# Patient Record
Sex: MALE | Race: WHITE
[De-identification: names, ages, dates, MRNs, and addresses within clinical notes are randomized per-mention and may not be internally consistent; named-entity substitution may affect disease eponyms.]

---

## 2018-12-13 ENCOUNTER — HOSPITAL ENCOUNTER (EMERGENCY)
Dept: HOSPITAL 65 - ER | Age: 33
Discharge: HOME | End: 2018-12-13
Payer: COMMERCIAL

## 2018-12-13 VITALS — HEIGHT: 71 IN | WEIGHT: 175 LBS | BODY MASS INDEX: 24.5 KG/M2

## 2018-12-13 VITALS — SYSTOLIC BLOOD PRESSURE: 144 MMHG | DIASTOLIC BLOOD PRESSURE: 79 MMHG

## 2018-12-13 VITALS — SYSTOLIC BLOOD PRESSURE: 159 MMHG | DIASTOLIC BLOOD PRESSURE: 94 MMHG

## 2018-12-13 DIAGNOSIS — R10.11: ICD-10-CM

## 2018-12-13 DIAGNOSIS — R05: ICD-10-CM

## 2018-12-13 DIAGNOSIS — G40.909: Primary | ICD-10-CM

## 2018-12-13 DIAGNOSIS — R07.9: ICD-10-CM

## 2018-12-13 LAB
ALP INTEST CFR SERPL: 94 U/L (ref 50–136)
ALT SERPL-CCNC: 49 U/L (ref 12–78)
APPEARANCE UR: CLEAR
AST SERPL-CCNC: 59 U/L (ref 0–35)
BASOPHILS # BLD AUTO: 0 10^3/UL (ref 0–0.1)
BASOPHILS NFR BLD AUTO: 0.1 % (ref 0–0.2)
BILIRUB UR STRIP.AUTO-MCNC: NEGATIVE MG/DL
CALCIUM SERPL-MCNC: 10.1 MG/DL (ref 8.4–10.5)
CO2 BLDA-SCNC: 27.6 MMOL/L (ref 20–32)
COLOR UR: YELLOW
EOSINOPHIL # BLD AUTO: 0.1 10^3/UL (ref 0–0.2)
EOSINOPHIL NFR BLD AUTO: 1.2 % (ref 0–5)
ERYTHROCYTE [DISTWIDTH] IN BLOOD BY AUTOMATED COUNT: 12.7 % (ref 11.5–14.5)
GLUCOSE PRE 100 G GLC PO SERPL-MCNC: 150 MG/DL (ref 70–110)
HGB BLD-MCNC: 14.5 G/DL (ref 13.9–16.3)
LYMPHOCYTES # BLD AUTO: 2 10^3/UL (ref 1–4.8)
LYMPHOCYTES NFR BLD AUTO: 18.6 % (ref 24–44)
MCH RBC QN AUTO: 30.3 PG (ref 26–34)
MCHC RBC AUTO-ENTMCNC: 34 G/DL (ref 33–37)
MCV RBC AUTO: 89.1 FL (ref 78–100)
MONOCYTES # BLD AUTO: 1 10^3/UL (ref 0.3–0.8)
MONOCYTES NFR BLD AUTO: 8.9 % (ref 5–12)
NEUTROPHILS # BLD AUTO: 7.6 10^3/UL (ref 1.8–7.7)
NEUTROPHILS NFR BLD AUTO: 71 % (ref 41–85)
PLATELET # BLD AUTO: 237 10^3/UL (ref 150–400)
PMV BLD AUTO: 10.8 FL (ref 7.8–11)
UROBILINOGEN UR QL STRIP.AUTO: NORMAL
WBC # BLD AUTO: 10.7 10^3/UL (ref 4.5–11)

## 2018-12-13 PROCEDURE — 36415 COLL VENOUS BLD VENIPUNCTURE: CPT

## 2018-12-13 PROCEDURE — 85730 THROMBOPLASTIN TIME PARTIAL: CPT

## 2018-12-13 PROCEDURE — 81002 URINALYSIS NONAUTO W/O SCOPE: CPT

## 2018-12-13 PROCEDURE — 80164 ASSAY DIPROPYLACETIC ACD TOT: CPT

## 2018-12-13 PROCEDURE — 85610 PROTHROMBIN TIME: CPT

## 2018-12-13 PROCEDURE — 99284 EMERGENCY DEPT VISIT MOD MDM: CPT

## 2018-12-13 PROCEDURE — 85025 COMPLETE CBC W/AUTO DIFF WBC: CPT

## 2018-12-13 PROCEDURE — 70450 CT HEAD/BRAIN W/O DYE: CPT

## 2018-12-13 PROCEDURE — 93005 ELECTROCARDIOGRAM TRACING: CPT

## 2018-12-13 PROCEDURE — 80307 DRUG TEST PRSMV CHEM ANLYZR: CPT

## 2018-12-13 PROCEDURE — 82550 ASSAY OF CK (CPK): CPT

## 2018-12-13 PROCEDURE — 80053 COMPREHEN METABOLIC PANEL: CPT

## 2018-12-13 PROCEDURE — 84484 ASSAY OF TROPONIN QUANT: CPT

## 2018-12-13 PROCEDURE — 71045 X-RAY EXAM CHEST 1 VIEW: CPT

## 2018-12-13 NOTE — DIREP
PROCEDURE:CT HEAD WITHOUT CONTRAST

 

TECHNIQUE:Axial cuts were obtained through the head, without intravenous 

contrast material.  The images were viewed at brain and bone settings.   

 

COMPARISON:None.

 

INDICATIONS:Seizures, AMS

 

FINDINGS:

VENTRICLES:Normal.

CEREBRUM:Diminished sulcal size may be secondary to recent seizure.  No 

hemorrhage, infarct or mass lesion

CEREBELLUM:Normal.

BRAINSTEM:Normal.

SKULL:Normal.

SINUSES:Normal.

OTHER:Negative.

 

CONCLUSION:Diminished sulcal size may be secondary to recent seizure or 

represent normal finding.  No other abnormalities.

 

 

 

Dictated by: Juan Richardson M.D. on 12/13/2018 at 08:36 PM     

Electronically Signed By: Juan Richardson M.D. on 12/13/2018 at 08:39 PM

## 2018-12-13 NOTE — NUR
PLAN OF CARE



PER DR SUÁREZ, AFTER MEDICALLY CLEARED, [facility] WILL BE CONTACTED FOR POSSIBLE 
FURTHER EVALUATION FOR TREATMENT.

## 2018-12-13 NOTE — ER.PDOC
General


Chief Complaint:  Requesting Medical Care


Stated Complaint:  POSSIBLE SEIZURES


Time seen by MD:  19:42


Source:  patient


Exam Limitations:  no limitations





History of Present Illness


Initial Comments


Pt states he is epileptic and that over the last several days he has been 

living in his car, lives in [place] and has not taken his medicines for that 

long. Not seizing at the moment he also states that he has chest pain and cough 

for a few days


Timing/Onset/Duration:  Unknown Duration, Unknown Number


Preceding Symptoms/Context:  none


Injury:  none


Allergies:  


Coded Allergies:  


     No Known Allergies (Unverified , 4/18/15)


Home Meds


No Active Prescriptions or Reported Meds





Past Medical History


Surgical History:  no surgical history





Constitutional:  no symptoms reported


EENTM:  no symptoms reported


Respiratory:  cough


Cardiovascular:  chest pain


Gastrointestinal:  no symptoms reported


Genitourinary:  no symptoms reported


Musculoskeletal:  no symptoms reported


Skin:  no symptoms reported


Psychiatric/Neurological:  see HPI, anxiety


Endocrine:  no symptoms reported


Hematologic/Lymphatic:  no symptoms reported





Physical Exam


General Appearance:  alert, no distress


EENT:  nml eye inspection, PERRL, no nystagmus, nml ENT inspection, no apparent

, pharynx nml, no CSF leak


Neck/Back:  neck supple, non-tender


Respiratory:  no resp distress, breath sounds nml, no evidence of rib injury, 

rhonchi (scattered)


CVS:  reg rate & rhythm, heart sounds nml


Abdomen:  no organomegaly, no distention, tenderness (RUQ minimal)


Skin:  color nml, no rash, warm/dry


Extremities:  non-tender, nml ROM, no pedal edema


Neuro/Psych:  oriented x 3, speech nml, mood/affect nml


Cerebellar:  nml tested, nml Romberg


Sensorimotor:  no motor deficit, no sensory deficit, reflexes nml





Departure


Time of Disposition:  21:14


Disposition:  01 HOME, SELF-CARE


Impression:  


 Primary Impression:  


 Seizure disorder


Condition:  Stable


Patient Instructions:  Seizure, Adult


Referrals:  


PCP,UNKNOWN (PCP)


PRIMARY CARE PROVIDER


Scripts


No Active Prescriptions or Reported Meds


Duration or Time Spent with Pa:  20 BASILICO,LEOPOLDO M MD Dec 13, 2018 19:50

## 2018-12-13 NOTE — NUR
DISCHARGE PLAN



PATIENT STATES THAT HE HAS HIS CAR AND CAN FIND HIS WAY BACK HOME TO [place], 
BUT HE DOES NOT HAVE ANY GAS IN HIS CAR OR MONEY TO GET ANY.

## 2018-12-13 NOTE — NUR
CONFUSION



PATIENT VOICES THAT HIS WIFE AND FATHER IN LAW LEFT HIM IN HIS VEHICLE IN 
"[facility]" [place] AND HE HAS BEEN IN HIS CAR FOR THREE DAYS WITH ON AND OFF 
SEIZURES. HE CAN REMEMBER THAT HE IS IN [place], [place] BECAUSE I TOLD HIM THAT IS 
WHERE HE IS, BUT HE DOES NOT KNOW WHERE [place], [place] IS. STATES HE LIVES IN 
[place], "I'M CLOSE TO [place], RIGHT?"

ONCE AGAIN ASKED PATIENT WHY HE WAS LIVING IN HIS CAR THE PAST THREE DAYS IF HE 
HAS A HOUSE IN [place], PATIENT IS UNABLE TO TELL ME WHY HE HAS BEEN IN HIS CAR. 


CONTINUES TO STATE THAT HIS WIFE DOESN'T KNOW WHERE HE IS AT.

## 2018-12-13 NOTE — PCM.EKG
[facility]

                                       

Test Date:    2018               Test Time:    20:19:12

Pat Name:     CATHY LOYD           Department:   

Patient ID:   [facility]-[id number]          Room:          

Gender:       M                        Technician:   [id number]

:          1985               Requested By: LEOPOLDO BASILICO

Order Number: [id number]           Reading MD:   Leopoldo Basilico

                                 Measurements

Intervals                              Axis          

Rate:         95                       P:            76

ID:           [id number]                      QRS:          73

QRSD:         102                      T:            67

QT:           362                                    

QTc:          454                                    

                           Interpretive Statements

Normal sinus rhythm

Normal ECG

No previous ECG available for comparison



Electronically Signed On 12- 7:13:55 CST by Leopoldo Basilico



Please click the below link to view image of tracing.

## 2018-12-13 NOTE — DIREP
PROCEDURE:CHEST 1 VIEW

 

COMPARISON:None.

 

INDICATIONS:Cough

 

FINDINGS:

LUNGS/PLEURA:Hyperaeration may represent asthma.  No infiltrates, nodules or 

mass lesions.

VASCULATURE:Normal.  Unremarkable pulmonary vasculature.

CARDIAC:Normal.  No cardiac silhouette abnormality or cardiomegaly. 

MEDIASTINUM:Normal.  No visible mass or adenopathy. 

BONES:Normal.  No fracture or visible bony lesion. 

OTHER:Negative.  

 

CONCLUSION:Hyperaeration otherwise normal.

 

 

 

Dictated by: Juan Richardson M.D. on 12/13/2018 at 08:40 PM     

Electronically Signed By: Juan Richardson M.D. on 12/13/2018 at 08:41 PM